# Patient Record
(demographics unavailable — no encounter records)

---

## 2017-01-01 NOTE — HP
DATE OF ADMISSION: 2017

 

DELIVERING OBSTETRICIAN:  Dr. Calderon.

 

HISTORY OF PRESENT ILLNESS:  Baby Jesus Gonzalez was admitted to NICU secondary to prematurity of 34.1 we
eks with premature rupture of membranes.

 

Baby Jesus Gonzalez is a 34.1 week estimated gestational age,  2545 gram birth weight male infant deliver
ed by repeat  section under spinal anesthesia on 2017 at 1421 hours at [facility] with Apgars of 9 at 1 minute and 9 at 5 minutes respectively to a 32-year-old  
3, para 2, AB 0 mother with good prenatal care.  EDC 2017.

 

Mother's prenatal labs are as follows:  Blood group O positive, antibody negative, RPR nonreactive, 
rubella immune, HBsAg negative, HIV negative, GC and chlamydia cultures negative and GBS unknown.  T
here is no history of hypertension, diabetes mellitus, alcohol, tobacco or drug use.  Mother was adm
itted today with premature rupture of membranes at 5:45 a.m. on 2017.  Membranes were ruptured
 for 8.6 hours before delivery.  Mother received 1 dose of Ancef and also received 1 dose of betamet
hasone at 9:39.  Biophysical profile was 8/8.

 

Mother has 2 children who were delivered by  section at 40 weeks in  and .  There we
re no other contributing factors.

 

The NICU team was in attendance at the time of delivery.  The infant was born with good Apgars and d
id not require any resuscitation.  The infant was admitted to NICU secondary to late prematurity.

 

PHYSICAL EXAMINATION:

GENERAL:  Infant in room air, responsive, pink, comfortable, in no acute distress.  No external anom
alies noted.

VITAL SIGNS:  Temperature 37 degrees, heart rate 168, respirations 40, blood pressure 76/32 with a m
jamee of 47.  Chemstrips 55.  Birth weight 2545 grams, length 48.3 cm, head circumference 33 cm.

HEENT:  Anterior fontanelle soft and flat.  Sutures well approximated.  Eyes normal, red reflex posi
tive.  Ears and nose normal and patent.  Palate intact with no cleft palate.

NECK:  Supple.

HEART:  Rate and rhythm regular.  No murmurs.  Peripheral pulses palpable with adequate perfusion.

LUNGS:  No retractions, equal breath sounds, good air exchange and clear.

ABDOMEN:  Soft, nondistended, normal bowel sounds, no masses palpable, no organomegaly, nontender.

GENITALIA:  Normal male with testes descending in the canals.

ANUS:  Patent.

HIPS:  Negative hip clicks.

SPINE:  Normal spine.

CENTRAL NERVOUS SYSTEM:  Infant has good suck, normal tone and symmetric Alphonso and normal activity.

SKIN:  No significant rashes.

 

LABS:  CBC and blood culture ordered.

 

ASSESSMENT:

1.  34.1 week late premature infant.

2.  Premature rupture of membranes.

3.  Delivered by  section.

 

PLAN:

1.  Will start the infant on feeding protocol and also start the infant on intravenous fluids D10W a
t 80 mL/kg per day.

2.  Respiratory, monitor for apnea and desaturations.

3.  Metabolic.  Will check electrolytes if clinically indicated.

4.  Bilirubin.  Mother's blood type is O positive.  Will check infant's blood type and monitor the i
nfant and check bilirubin levels at 48 hours.

5.  Infectious disease and hematology.  GBS is unknown.  Therefore, CBC and blood cultures were obta
ined.  We will treat the infant if clinically indicated.

6.  Cardiovascular.  Blood pressure is stable with no evidence of murmur.

7.  Neurology.  Neurological examination is essentially normal.

8.  Social.  Father was with infant at the bedside and updated the father about the infant's clinica
l condition as well as the treatment plans.  We will also update the mother.

 

 

Dictated By: ANITHA YATES/TEE

DD:    2017 14:57:03

DT:    2017 17:12:03

Conf#: [id number]

DID#:  [id number]

## 2017-01-01 NOTE — PN
[facility] L[facility]


 


 


 


 


 Progress Note 


 


Patient Name: Abrahan Gonzalez Unit Number: [id number]


YOB: 2017 Patient Status: Admitted Inpatient


Attending Doctor: Aster Vuong MD Account Number: [id number]


 


Edit: ASTER VUONG MD on 1/15/17 @ 11:41





Infant examined, chart reviewed and case discussed with Migue RODRIGUEZ as well as 

the bedside care team. This is a 5-day-old, 34.1 week late premature infant 

with a corrected gestational age of 34.5 weeks. Infant's problems or 

significant for hypocalcemia and was started on calcium supplementation as well 

as formula change to PM 6040 yesterday. Weight today is 2400 g decreased by 15 

g. Infant's physical examination shows infant in open crib responsive pink 

comfortable in no acute distress and with essentially normal physical 

examination except for mild perianal redness and jaundice. Concur with the 

complete physical examination documented below.


Medications include caffeine citrated. Labs today showed a calcium level of 8.5 

with a phosphorus level of 7.1 and bilirubin of 10.1.


Infant is on feedings with the breast milk or PM 6040 at 48 ML every 3 hours. 

Infant attempted 5 nipple. Feedings and is able to take only up to 16 ML and 

required mostly complete or partial gavage supplementation. Output is adequate. 

Infant remains stable in room air with no documented apnea bradycardia. Infant'

s bilirubin level has increased to 10 today. Problem list as well as care plans 

reviewed and discussed with Migue RODRIGUEZ and agree with the complete care plans 

documented in the note below.


________________________________________________________________________________

_____


  


Date/Time of Note


Date/Time of Note


DATE: 1/15/17 


TIME: 09:34





Neonatology History


Date/Time


Admit Date/Time


2017 at 14:21





Day of Life


Day of Life


5





History of Present Illness


HPI


This is a 34-1/7 week LGA infant corrected at 34-5/7 weeks' gestation born by 

repeat  section to mom with rupture membranes for 8 hours. No 

supplemental oxygen needed. Admitted for prematurity and poor feeding of the 

. on feedings and needing gavage feeding.hyperphosphatemia and 

hypocalcemia, on PM 60/40 and oral Calcium supplements. At risk for 

hyperbilirubinemia, hypoglycemia, electrolyte imbalance, poor feeding, and long-

term neurodevelopmental problems





Physical Exam


Vital Signs


Vitals





 Vital Signs








  Date Time  Temp Pulse Resp B/P Pulse Ox O2 Delivery O2 Flow Rate FiO2


 


1/15/17 07:22  152 35  96   21


 


1/15/17 05:00 99.1 142 40  97   


 


1/15/17 03:08  149 47  95   21


 


1/15/17 02:00 99.1 159 56  92   





NPASS Score-Pain: 0





I&O/Weight


I&O


Daily Weight: 2400 grams, Daily Weight change from yesterday: -15.0 grams, 

Percent change from birth: -5.697, Weight based intake: 153.3333 mL/kg/day, 

Weight based output: 0 mL/kg/hr





Physical Exam


Active and alert in open bassinet.


HEENT: [place] soft and flat. Eyes clear without drainage. Ears nose and 

throat without abnormality.


Pulmonary: Respirations are comfortable, breath sounds are bilaterally clear 

and equal.


Cardiovascular: Heart rate and rhythm are normal, no murmur is auscultated. 

Perfusion is good with  quick capillary refill.


Abdomen: Soft without distention. No masses palpated. Umbilical stump dry 

without redness


: Normal male genitalia.


Neuro: Tone and behavior appropriate for gestational age.


Dermatology: Perianal redness, mild jaundice


Extremities: Full range of motion, tone and behavior appropriate for 

gestational age.


Head Circumference:  28.0





Medications





 Current Medications


Calcium Gluconate (Ca Gluc Po (Nicu)) 480 mg Q6 PO  Last administered on 1/15/

17at 06:12; Admin Dose 480 MG;  Start 17 at 12:00





Laboratory


Results 24 hrs





Laboratory Tests








Test


  1/15/17


04:45


 


Calcium Level 8.5  


 


Phosphorus Level 7.1  H


 


Total Bilirubin 10.1  











Medical Decision Making


Assessment


1. Growth and nutrition: The infant is tolerating feedings ofBreast milk or PM 

60/40  48 mL every 3 hours with weight loss of 15 g the last 24 hours. The 

infant attempted to nipple 5 feedings taking between 1and 16 mL only and 

required partial gavage, intake 153 MLS per KG per day. Was able to complete by 

bottle only 13% of feedings. OT PT involved. 3 small milk  spit ups. Output is 

good and temperature stable in a crib.


2. Risk apnea prematurity: The infant remains on room air saturations greater 

than or equal to 99% no recorded apnea, bradycardia, or desaturations.


3. Cardiac: Hemodynamically stable last blood pressure mean 43 no clinical 

signs of the ductus arteriosus.


4. Jaundice: Baby is O+ Usama negative bilirubin 1/15 is 10.


5. Metabolic: Electrolytes stable, calcium  7.1 on  and 7.3 on  with 

phos 8.1. PM 60/40 and oral calcium supplements begun . calcium 8.5 with 

phos 7.1 on 1/15


6. Infectious disease: No clinical signs or symptoms blood culture negative CBC 

unremarkable.


7. CNS: Tone appropriate needs hearing screen and car seat challenge prior to 

discharge.


8. Social: Parents at bedside and updated on infant's status and progress





Today's Plan


Plan


1. Continue to work with OT/PT and parents on nutritive support


2.continue feeds of PM 60/40 or breast milk and dc oral calcium supplements


3. Monitor for feeding tolerance, gastroesophageal reflux, consistent weight 

gain


4. Monitor for apnea prematurity


5. Follow-up calcium and phosphorus in a.m.


6. Hearing screen and car seat challenge prior to discharge


7. Same supportive care, training, and teaching.











MIGUE MURILLO NP John 15, 2017 09:39

## 2017-01-01 NOTE — DS
Date/Time of Note


Date/Time of Note


DATE: 17 


TIME: 15:06





 SOAP


Subjective Findings


Other Findings


1. The infant was admitted with history of premature rupture membranes 

resulting in delivery with Apgars of 9 at 1 minute 9 at 5 minutes. No evidence 

of respiratory distress was noted during the hospital stay infant did not have 

any apnea of prematurity either.


2. Hypocalcemia the infant had initial low calcium of 7.1 which was treated 

with calcium gluconate from  through 1/15. Phosphorus level was initially 

elevated at 8.7 normalized at 6.8 prior to discharge.


3. Jaundice: The infant was O+ Usama negative. Had a maximum bilirubin 12.4 

treated with phototherapy from  and 


4. Nutrition: The infant initially was placed on IV fluids and started on 

feedings advancing slowly. The infant initially had poor nippling but that 

improved had limited weight gain on  through .  At the time of 

discharge the infant now being 65 g is on breast milk plus NeoSure.


5. Discharge testing the infant had a hearing screen done which was passed car 

seat challenge which was passed congenital heart disease screen which was passed





Vital Signs


Vital Signs





 Vital Signs








  Date Time  Temp Pulse Resp B/P Pulse Ox O2 Delivery O2 Flow Rate FiO2


 


17 14:30 98.6 145 38  97   


 


17 11:30 98.8 164 40  96   


 


17 11:06  181 36  99   21


 


17 08:00 98.8 160 46 65/34 96   


 


17 07:46  136 43  98   21





NPASS Score-Pain: 0





Physical Exam


HEENT:  [place] open,soft,flat, Normocephalic


Lungs:  Clear to auscultation


Heart:  Regular R&R, No murmur


Abdomen:  Soft, No hepatosplenomegaly


Skin:  No rashes, No signs of jaundice





Assessment


Pre-Term [place]:  Boy


Assessment:  SGA,  Jaundice





Plan


Feedings every 2-3 hours with breast milk or NeoSure advance 22-calorie per 

ounce with a minimum of 45 mL every 3 hours.


Follow-up with Dr. Serra in 2 days


No discharge medications





Pending Labs/Cultures


PKU pending





Condition on Discharge


[place] Condition:  Stable











GEO JOHNSON MD 2017 15:10

## 2017-01-01 NOTE — PN
[facility]


 


 


 


 


 Progress Note 


 


Patient Name: Abrahan Gonzalez Unit Number: [id number]


YOB: 2017 Patient Status: Admitted Inpatient


Attending Doctor: Aster Vuogn MD Account Number: [id number]


 


Edit: ROBERTA AMES MD on 17 @ 12:37





I have seen and examined and reviewed the Plan with the nurse practitioner. 

Agree with the exam, evaluation,


And treatment plan to continue same feeds, monitor input, output and weight 

closely, encourage nippling and


 advance as tolerated , watch for clinical apnea and bradycardia and maintain 

oxygen saturations greater than 90%,


Follow serum calcium level and continue breast milk feeds.


________________________________________________________________________________

_____


  


Date/Time of Note


Date/Time of Note


DATE: 17 


TIME: 09:41





Neonatology History


Date/Time


Admit Date/Time


2017 at 14:21





Day of Life


Day of Life


9





History of Present Illness


HPI


This is a 34-1/7 week aga infant corrected at 35-2/7 weeks' gestation born by 

repeat  section to mom with  rupture of membranes.  the infant 

is a poor nipple feeder requiring ng feedings and ot support, has history of 

hypocalcemia requiring calcium gluconate supplementation and  PM 60/40 ,, now 

stable on breast milk.marilia elevated  and put on blanket,At risk for 

hyperbilirubinemia, hypoglycemia, sepsis/nec, electrolyte imbalance, and long-

term neurodevelopmental problems





Physical Exam


Vital Signs


Vitals





 Vital Signs








  Date Time  Temp Pulse Resp B/P Pulse Ox O2 Delivery O2 Flow Rate FiO2


 


17 08:30 98.8 156 40 83/37 96   


 


17 07:59  155 41  98   21


 


17 05:00 98.8 164 36  99   


 


17 03:07  169 37  97   21


 


17 02:00 98.6 158 42  97   





NPASS Score-Pain: 0





I&O/Weight


I&O


Daily Weight: 2435 grams, Daily Weight change from yesterday: 55.0 grams, 

Percent change from birth: -4.322, Weight based intake: 156.0784 mL/kg/day, 

Weight based output: 0 mL/kg/hr





Physical Exam


Active and alert in open bassinet.


HEENT: [place] soft and flat. Eyes clear without drainage. Ears nose and 

throat without abnormality.


Pulmonary: Respirations are comfortable, breath sounds are bilaterally clear 

and equal.


Cardiovascular: Heart rate and rhythm are normal, no murmur is auscultated. 

Perfusion is good with  quick capillary refill.


Abdomen: Soft without distention. No masses palpated.


: Normal male genitalia. Fresh circumcision no bleeding noted


Neuro: Tone and behavior appropriate for gestational age.


Dermatology: Skin clear and free of rashes. Mild jaundice noted


Extremities: Full range of motion, tone and behavior appropriate for 

gestational age.


Head Circumference:  32.5





Medications





 Current Medications


Multivitamins/ Vitamin C (Poly-Vi-Sol (Nicu)) 1 ml DAILY PO  Last administered 

on t 08:25; Admin Dose 1 ML;  Start 17 at 09:00





Laboratory


Results 24 hrs





Laboratory Tests








Test


  17


05:05


 


Total Bilirubin 12.4  H











Medical Decision Making


Assessment


1. Nutrition. Infant's Daily Weight: 2435 grams,up by 55 g in the past 24 hours 

decreased by 6 percent since birth. Weight based intake: 156mL/kg/day, infant 

voided 8 and stool 4 over previous 24 hours. Infant's intake includes 20-

calorie per ounce breastmilk  . Nippled completely 6 feeds, taking 89% by bottle

,Partially nipple fed 2 feedings  Minimal residuals


2. Risk apnea prematurity: The infant remains on room air. No recorded apnea, 

bradycardia, or desaturations noted over previous 24 hours.


3. Jaundice: Blood type O+ Usama negative. Bilirubin  is 9.9 from previous 

level of 10 on 1/15.up to 12.4 on , perhaps breast milk jaundice?


4. Early transient hypocalcemia of :  calcium was 7.1 on , 7.3 on  with phos 8.1. PM 60/40 and oral calcium supplements begun . Calcium of 

9.4 and phosphorus was 6.8 on ,calcium dc'd 1/15


5. CNS: Tone appropriate needs car seat challenge prior to discharge, hearing 

screen passed





Today's Plan


Plan


Continue to work on nippling feeds


Continue with frequent monitoring of vital signs monitor for apneas and 

bradycardias


Monitor for sepsis/necrotizing enterocolitis


begin bili blanket and follow bili in AM


Maintain neutral thermal environment


Maintain communications with family members


if no Breast milk, give MIGUE Loving NP 2017 09:45

## 2017-01-01 NOTE — PN
[facility]


 


 


 


 


 Progress Note 


 


Patient Name: Abrahan Gonzalez Unit Number: [id number]


YOB: 2017 Patient Status: Admitted Inpatient


Attending Doctor: Aster Vuong MD Account Number: [id number]


 


Edit: ROBERTA AMES MD on 17 @ 12:32





I have seen and examined the baby and reviewed the care plan with the nurse 

practitioner. Agree with the exam, evaluation


And treatment plan to continue same feeds, encourage nippling, continue to 

monitor serum calcium and started on regular


Formula, watch for clinical apnea and bradycardia and maintain oxygen 

saturations greater than 90%, watch for clinical jaundice


And monitor bilirubin as needed. Baby needs continued hospital observation 

until stable with nipple feeds and weight gain.


________________________________________________________________________________

_____


  


Date/Time of Note


Date/Time of Note


DATE: 17 


TIME: 10:53





Neonatology History


Date/Time


Admit Date/Time


2017 at 14:21





Day of Life


Day of Life


7





History of Present Illness


HPI


This is a 34-1/7 week aga infant corrected at 35-0/7 weeks' gestation born by 

repeat  section to mom with  rupture of membranes.  the infant 

is a poor nipple feeder requiring ng feedings and ot support, has history of 

hypocalcemia requiring calcium gluconate supplementation and  PM 60/40 .. At 

risk for hyperbilirubinemia, hypoglycemia, sepsis/nec, electrolyte imbalance, 

and long-term neurodevelopmental problems





Physical Exam


Vital Signs


Vitals





 Vital Signs








  Date Time  Temp Pulse Resp B/P Pulse Ox O2 Delivery O2 Flow Rate FiO2


 


17 07:46  152 41  95   21


 


17 05:00 98.8 150 42  100   


 


17 03:13  142 37  94   21





NPASS Score-Pain: 0





I&O/Weight


I&O


Daily Weight: 2360 grams, Daily Weight change from yesterday: -40.0 grams, 

Percent change from birth: -7.269, Weight based intake: 150.9803 mL/kg/day, 

Weight based output: 0 mL/kg/hr





Physical Exam


Active and alert in [facility].


HEENT: [place] soft and flat. Eyes clear without drainage. Ears nose and 

throat without abnormality.


Pulmonary: Respirations are comfortable, breath sounds are bilaterally clear 

and equal.


Cardiovascular: Heart rate and rhythm are normal, no murmur is auscultated. 

Perfusion is good with  quick capillary refill.


Abdomen: Soft without distention. No masses palpated.


: Normal male genitalia.


Neuro: Tone and behavior appropriate for gestational age.


Dermatology: Mild perianal redness


Extremities: Full range of motion, tone and behavior appropriate for 

gestational age.


Head Circumference:  33.0





Medical Decision Making


Assessment


1. Nutrition. Infant's Daily Weight: 2360 grams, down by 40 g in the past 24 

hours decreased by 7 percent since birth. Weight based intake: 150mL/kg/day, 

infant voided 6 and stool 4 over previous 24 hours. Infant's intake includes 

20-calorie per ounce breastmilk as well as PM 60/40. Nippled completely 4, 

taking 63% by bottle,Partially nipple fed 3 feedings  Minimal residuals


2. Risk apnea prematurity: The infant remains on room air. No recorded apnea, 

bradycardia, or desaturations noted over previous 24 hours.


3. Jaundice: Blood type O+ Usama negative. Bilirubin  is 9.9 from previous 

level of 10 on 1/15.


5. Early transient hypocalcemia of :  calcium was 7.1 on , 7.3 on  with phos 8.1. PM 60/40 and oral calcium supplements begun . Calcium of 

9.4 and phosphorus was 6.8 on ,calcium dc'd 1/15


6. CNS: Tone appropriate needs hearing screen and car seat challenge prior to 

discharge





Today's Plan


Plan


Continue to work on nippling feeds


Continue with frequent monitoring of vital signs monitor for apneas and 

bradycardias


Monitor for sepsis/necrotizing enterocolitis


Monitor for jaundice clinically


Maintain neutral thermal environment


Maintain communications with family members


if no Breast milk, give dipeshsure











MIGUE MURLILO NP 2017 10:58

## 2017-01-01 NOTE — PN
Date/Time of Note


Date/Time of Note


DATE: 17 


TIME: 12:18





Neonatology History


Date/Time


Admit Date/Time


2017 at 14:21





Day of Life


Day of Life


11





History of Present Illness


HPI


This is a 34-1/7 week aga infant corrected at 35 4/7 weeks' gestation born by 

repeat  section to mom with  rupture of membranes.  the infant 

is a poor nipple feeder requiring ng feedings and OT support, has history of 

hypocalcemia S/P  calcium gluconate supplementation and  PM 60/40,  

hyperbilirubinemia requiring phototherapy,At risk for   hypoglycemia, sepsis/nec

, electrolyte imbalance, and long-term neurodevelopmental problems





Physical Exam


Vital Signs


Vitals





 Vital Signs








  Date Time  Temp Pulse Resp B/P Pulse Ox O2 Delivery O2 Flow Rate FiO2


 


17 11:04  167 49  100   21


 


17 10:30 98.6 165 48  100   


 


17 08:03 98.8 135 58 71/48 100   


 


17 07:18  206 66  95   21


 


17 05:30 97.9 150 34  96   





NPASS Score-Pain: 0





I&O/Weight


I&O


Daily Weight: 2400 grams, Daily Weight change from yesterday: -15.0 grams, 

Percent change from birth: -5.697, Weight based intake: 131.7647 mL/kg/day, 

Weight based output: 0 mL/kg/hr





Physical Exam


HEENT: [place] soft flat, eyes clear no discharge, ears normal, nose patent, 

oropharynx normal.


Chest: Breath sounds equal clear no rales, rhonchi, or retractions.


Cardiac: Regular rhythm no murmurs appreciated with good pulses


Abdomen: Soft, round, no organomegaly or masses noted with good bowel sounds.


Genitalia: Normal male, patent anus.


Extremities: Full range of motion with good perfusion


CNS: Tone appropriate response to pain and touch.


Skin: Pink with no rashes.


Head Circumference:  32.5





Medications





 Current Medications


Multivitamins/ Vitamin C (Poly-Vi-Sol (Nicu)) 1 ml DAILY PO  Last administered 

on t 07:55; Admin Dose 1 ML;  Start 17 at 09:00





Medical Decision Making


Assessment


1. Growth and nutrition: The infant is tolerating ad padmini. nipple feedings 

taking between 45 and 65 mL every 3 hours of breast milk or NeoSure advance 22-

calorie. The infant however continues to lose weight down 35 g over the last 2 

days. We'll maintain on nutritive support may need to consider advancing to 22 

vangie if weight gain doesn't occur in the next 24 hours. No emesis no clinical 

signs of gastroesophageal reflux. Output is good and temperature stable in a 

crib.


2. Apnea prematurity: The infant remains on room air with saturations greater 

than or equal to 95% no recorded apnea, bradycardia, or desaturations last 24 

hours.


3. Cardiac: Hemodynamically stable less blood pressure mean 54 signs of the 

ductus arteriosus.


4. Anemia: Last hematocrit 54.5 done on  remains on Poly-Vi-Sol recheck in 

a.m.


5. CNS: Tone appropriate hearing screen was passed congenital heart disease 

screen was passed, car seat challenge passed.


6. Social: Parents visiting and updated on infant's status and progress.





Today's Plan


Plan


1. Continue ad padmini. feedings and monitor for consistent weight gain.


2. Monitor for feeding tolerance or clinical signs of gastroesophageal reflux


3. Monitor for apnea prematurity


4. Complete discharge training and teaching





Anticipate discharge in a.m. if the infant has weight gain. If no weight gain we

'll consider advancing to 22-calorie/oz











GEO JOHNSON MD 2017 12:28

## 2017-01-01 NOTE — PN
Date/Time of Note


Date/Time of Note


DATE: 17 


TIME: 13:54





Neonatology History


Date/Time


Admit Date/Time


2017 at 14:21





Day of Life


Day of Life


10





History of Present Illness


HPI


This is a 34-1/7 week aga infant corrected at 35-3/7 weeks' gestation born by 

repeat  section to mom with  rupture of membranes.  the infant 

is a poor nipple feeder requiring ng feedings and ot support, has history of 

hypocalcemia S/P  calcium gluconate supplementation and  PM 60/40,  

hyperbilirubinemia requiring phototherapy,At risk for   hypoglycemia, sepsis/nec

, electrolyte imbalance, and long-term neurodevelopmental problems





Physical Exam


Vital Signs


Vitals





 Vital Signs








  Date Time  Temp Pulse Resp B/P Pulse Ox O2 Delivery O2 Flow Rate FiO2


 


17 11:17  159 66  96   21


 


17 08:30 99.1 160 48 87/37 99   


 


17 07:51  175 65  94   21





NPASS Score-Pain: 0





Physical Exam


HEENT: [place] soft and flat. Eyes clear without drainage. Ears nose and 

throat without abnormality.


Pulmonary: Respirations are comfortable, breath sounds are bilaterally clear 

and equal.


Cardiovascular: Heart rate and rhythm are normal, no murmur is auscultated.  


Abdomen: Soft without distention. No masses palpated.


: Normal male genitalia, circumcised


Neuro: Tone and behavior appropriate for gestational age.


Dermatology: Skin clear and free of rashes. Mild jaundice noted


Extremities: Full range of motion, tone and behavior appropriate for 

gestational age.


Head Circumference:  32.5





Medications





 Current Medications


Multivitamins/ Vitamin C (Poly-Vi-Sol (Nicu)) 1 ml DAILY PO  Last administered 

on t 08:17; Admin Dose 1 ML;  Start 17 at 09:00





Laboratory


Results 24 hrs





Laboratory Tests








Test


  17


05:10


 


Total Bilirubin 9.1  #











Medical Decision Making


Assessment


dol 10 for 34 1/7 week late  infant


1. nutrition.  infant's Daily Weight: 2415 grams, decreased by 20.0 grams over 

previous 24 hours.  Weight based intake: 169.0196 mL/kg/day, voided x 8 and 

stooled x 7 over previous 24 hours. infant's intake includes 20-calorie per 

ounce breast milk feedings. The infant was able to nipple feed ad padmini. 6 over 

previous 24 hours. To partially nipple feedings were noted above 30 mL each for 

which the infant was gavage fed 2.


2. Risk apnea prematurity: The infant remains on room air. No recorded apnea, 

bradycardia, or desaturations noted over previous 24 hours.


3. Hyperbilirubinemia. Blood type O+ Usama negative. Placed on phototherapy on 

 for a bilirubin of 12.4. Bilirubin this morning has decreased to 9.1  


4. Early transient hypocalcemia of :  calcium was 7.1 on . PM 60/40 

and oral calcium supplements begun 1/141/15. Calcium of 9.4 and phosphorus was 

6.8 on , within acceptable limits 


5. CNS: In open crib maintaining temperatures.


6. Social. Parents visiting and learning infant care





Today's Plan


Plan


Continue to work on nippling feeds


Frequent monitoring of vital signs monitor for apneas and bradycardias


Monitor for sepsis/necrotizing enterocolitis


Discontinue phototherapy


Monitor for symptomatic hypocalcemia


Discharge preparation including car seat challenge


Critical congenital heart disease screening passed


Hepatitis B vaccine prior to discharge











YANA JAFFE MD 2017 13:55

## 2017-01-01 NOTE — PDOCDIS
NICU Discharge Instructions


Pediatrician Information








Follow-up with Physician:   2





 Day/Days











Diet


Feeding Instructions:  Breast Feed Ad LibNICU Formula:  Enfamil Enfacare 22cal





Additional Instructions


Additional Information


Feedings every 2-3 hours with breast milk or NeoSure advance 22-calorie per 

ounce with a minimum of 45 mL every 3 hours.


Follow-up with Dr. Serra in 2 days


No discharge medications











GEO JOHNSON MD Jan 22, 2017 15:06

## 2017-01-01 NOTE — PN
[facility]


 


 


 


 


 Progress Note 


 


Patient Name: Abrahan Gonzalez Unit Number: [id number]


YOB: 2017 Patient Status: Admitted Inpatient


Attending Doctor: Aster Vuong MD Account Number: [id number]


 


Edit: YANA JAFFE MD on 17 @ 16:20





I have examined and rounded on the patient at the bedside with the  

care team. I have reviewed the caregiver's physical exam, assessment and plan 

and agree with today's plan of care





Yana Jaffe


________________________________________________________________________________

_____


  


Date/Time of Note


Date/Time of Note


DATE: 17 


TIME: 10:17





Neonatology History


Date/Time


Admit Date/Time


2017 at 14:21





Day of Life


Day of Life


2





History of Present Illness


HPI


This is a 34-1/7 week LGA infant born by repeat  section to mom with 

rupture membranes for 8 hours. No supplemental oxygen needed. Admitted for 

prematurity. Started on feeding protocol and needing gavage feeding. At risk 

for hyperbilirubinemia, hypoglycemia, electrolyte imbalance, poor feeding, and 

long-term neurodevelopmental problems





Physical Exam


Vital Signs


Vitals





 Vital Signs








  Date Time  Temp Pulse Resp B/P Pulse Ox O2 Delivery O2 Flow Rate FiO2


 


17 09:00 98.2 152 68 69/31 100   


 


17 07:58  148 39  100   21


 


17 06:10 98.2 133 55  99   


 


17 03:09  132 47  99   21


 


17 03:00 97.9 144 52  99   





NPASS Score-Pain: 0





I&O/Weight


I&O


Daily Weight: 2540 grams, Daily Weight change from yesterday: -5.0 grams, 

Percent change from birth: -0.196, Weight based intake: 0 mL/kg/day, Weight 

based output: 1.768 mL/kg/hr





Physical Exam


Active and alert. On panda warmer on room air


HEENT: [place] soft and flat. Eyes clear without drainage. Ears nose and 

throat without abnormality.


Pulmonary: Respirations are comfortable, breath sounds are bilaterally clear 

and equal.


Cardiovascular: Heart rate and rhythm are normal, no murmur is auscultated. 

Perfusion is good with  quick capillary refill.


Abdomen: Soft without distention. No masses palpated.


: Normal male genitalia.


Neuro: Tone and behavior appropriate for gestational age.


Dermatology: Skin clear and free of rashes. Minimal jaundice


Extremities: Full range of motion, tone and behavior appropriate for 

gestational age.


Head Circumference:  28.0





Medications





 Current Medications


Dextrose (D10w (Nicu)) 250 ml @  8.5 mls/hr Q24H IV  Last administered on t 15:05; Admin Dose 8.5 MLS/HR;  Start 17 at 14:44





Laboratory


Results 24 hrs





Laboratory Tests








Test


  17


14:48 17


15:00 17


16:19 17


05:51


 


Bedside Glucose 55  L  90   75  


 


Anisocytosis  1+    


 


Blood Morphology Comment      


 


Eosinophils #  0.3    


 


Eosinophils %  4.0    


 


Hematocrit  54.5    


 


Hemoglobin  18.8    


 


Lymphocytes #  3.2  H  


 


Lymphocytes %  37.0    


 


Macrocytosis  1+    


 


Mean Corpuscular Hemoglobin  35.2  H  


 


Mean Corpuscular Hemoglobin


Concent 


  34.4  


  


  


 


 


Mean Corpuscular Volume  102.4    


 


Mean Platelet Volume  7.4    


 


Monocytes #  0.9    


 


Monocytes %  10.0    


 


Neutrophils #  4.2    


 


Neutrophils %  49.0  L  


 


Nucleated Red Blood Cells #      


 


Nucleated Red Blood Cells %  6.0  H  


 


Platelet Count  224    


 


Platelet Estimate


  


  PLT APPEAR


ADEQUATE 


  


 


 


Polychromasia  1+    


 


Red Blood Count  5.32    


 


Red Cell Distribution Width  17.1  H  


 


White Blood Count  8.6    











Medical Decision Making


Assessment


1. Growth and nutrition: The infant was started on feeding protocol on 

admission and currently is tolerating feedings of Similac special care 20-

calorie 22 MLS every 3 hours mostly by NG tube with supplemental IV fluids of 

D10 W. Has had minimal residuals. Attempted to nipple feed twice taking only 

small amounts 2 MLS at 1.6 MLS other. Accu-Cheks screen 75


2. Respiratory: The infant has not required supplemental oxygen outside the 

delivery room and has not had any episodes of apnea bradycardia or desaturation


3. infection: Infant's screening CBC shows a white count of 8.6 with platelet 

count 224,000 and a hematocrit 55 and a normal differential. Infant is not on 

antibiotics, blood cultures pending. There was rupture membranes for 8 hours 

and mom's GBS status was unknown


4. Hematology: Hematocrit on admission is 55. Baby appears mildly jaundiced


5. Social: Father has been at bedside and been updated





Today's Plan


Plan


1. Continue to advance feedings per protocol and wean IV fluids. Follow Accu-

Cheks screens. Cue based nippling as tolerated.


2. Continue neutral thermal environment and monitor vital signs frequently


3. Follow blood culture results and monitor for signs of infection


4. Check electrolytes and calcium in the morning as well as bilirubin


5. Update family with plans and information











MIGUE MURILLO NP 2017 10:23

## 2017-01-01 NOTE — PN
Date/Time of Note


Date/Time of Note


DATE: 17 


TIME: 12:46





Neonatology History


Date/Time


Admit Date/Time


2017 at 14:21





Day of Life


Day of Life


6





History of Present Illness


HPI


This is a 34-1/7 week aga infant corrected at 34-6/7 weeks' gestation born by 

repeat  section to mom with  rupture of membranes.  the infant 

is a poor nipple feeder requiring ng feedings and ot support, has history of 

hypocalcemia requiring calcium gluconate supplementation and  PM 60/40 .. At 

risk for hyperbilirubinemia, hypoglycemia, sepsis/nec, electrolyte imbalance, 

and long-term neurodevelopmental problems





Physical Exam


Vital Signs


Vitals





 Vital Signs








  Date Time  Temp Pulse Resp B/P Pulse Ox O2 Delivery O2 Flow Rate FiO2


 


17 11:11  156 44  98   21


 


17 11:00 98.2 144 49  100   


 


17 08:00 98.8 139 53 63/36 99   


 


17 07:24  170 48  97   21


 


17 05:00 98.4 150 48  99   





NPASS Score-Pain: 0





I&O/Weight


I&O








Physical Exam


HEENT: Anterior fontanelles open and flat. There is no cleft lip or palate. 

Nasogastric tube is in place


Pulmonary: Good air exchange bilaterally. No grunting, flaring, or retractions


Cardiovascular: Regular rate and rhythm. No audible murmur


Abdomen: Soft, nondistended. Adequate bowel sounds. No discoloration. No 

masses. Umbilicus within normal limits


: Normal male genitalia


Extremities: well-perfused


DERM: Mild jaundice. No rashes


Neuro: Normal tone. Normal response to touch and stimuli


Head Circumference:  28.0





Laboratory


Results 24 hrs





Laboratory Tests








Test


  17


04:00


 


Calcium Level 9.4  


 


Phosphorus Level 6.8  H


 


Total Bilirubin 9.9  











Medical Decision Making


Assessment


Day of life 6 for 34 and 1/7 week late  infant


1. Nutrition. Infant's Daily Weight: 2400 grams, essentially unchanged over 

previous 24 hours. Decreased by -5 percent since birth. Weight based intake: 

152.1568 mL/kg/day, infant voided 6 and stool 4 over previous 24 hours. Infant

's intake includes 20-calorie per ounce breastmilk as well as PM 60/40. Nippled 

completely 1. Partially nipple fed between 5-28 ML's of feedings 7. Was 

gavage fed 7. Minimal residuals


2. Risk apnea prematurity: The infant remains on room air. No recorded apnea, 

bradycardia, or desaturations noted over previous 24 hours.


3. Jaundice: Blood type O+ Usama negative. Bilirubin this morning has 

decreased to 9.9 from previous level of 10 on 1/15.


5. Early transient hypocalcemia of :  calcium was 7.1 on , 7.3 on  with phos 8.1. PM 60/40 and oral calcium supplements begun . Calcium of 

9.4 and phosphorus was 6.8 on 


6. CNS: Tone appropriate needs hearing screen and car seat challenge prior to 

discharge





Today's Plan


Plan


Continue to work on nippling feeds


Continue with frequent monitoring of vital signs monitor for apneas and 

bradycardias


Monitor for sepsis/necrotizing enterocolitis


Monitor for jaundice clinically


Maintain neutral thermal environment


Maintain communications with family members











YANA JAFFE MD 2017 12:53

## 2017-01-01 NOTE — PN
[facility]


 


 


 


 


 Progress Note 


 


Patient Name: Abrahan Gonzalez Unit Number: [id number]


YOB: 2017 Patient Status: Admitted Inpatient


Attending Doctor: Aster Vuong MD Account Number: [id number]


 


Edit: GEO JOHNSON MD on 17 @ 14:18





I have seen and examined this infant with GAEL RODRIGUEZ.  Concur with physical 

examination and assessment. HEENT normal, chest clear good breath sounds, heart 

regular rhythm no murmurs, abdomen soft good bowel sounds no organomegaly, 

genitalia normal, extremities full range of motion good perfusion, CNS tone 

appropriate, skin pink no rashes.  Concur with plan to work on nutritive support

, monitor for respiratory distress or apnea prematurity, follow hematocrit 

weekly, complete discharge training and teaching.


________________________________________________________________________________

_____


  


Date/Time of Note


Date/Time of Note


DATE: 17 


TIME: 09:58





Neonatology History


Date/Time


Admit Date/Time


2017 at 14:21





Day of Life


Day of Life


8





History of Present Illness


HPI


This is a 34-1/7 week aga infant corrected at 35-1/7 weeks' gestation born by 

repeat  section to mom with  rupture of membranes.  the infant 

is a poor nipple feeder requiring ng feedings and ot support, has history of 

hypocalcemia requiring calcium gluconate supplementation and  PM 60/40 ,, now 

stable on breast milk.. At risk for hyperbilirubinemia, hypoglycemia, sepsis/nec

, electrolyte imbalance, and long-term neurodevelopmental problems





Physical Exam


Vital Signs


Vitals





 Vital Signs








  Date Time  Temp Pulse Resp B/P Pulse Ox O2 Delivery O2 Flow Rate FiO2


 


17 07:50  137 40  98   21


 


17 05:00 98.6 144 45  98   


 


17 03:06  148 36  97   21


 


17 02:00 98.4 142 38  97   





NPASS Score-Pain: 0





I&O/Weight


I&O


Daily Weight: 2380 grams, Daily Weight change from yesterday: 20.0 grams, 

Percent change from birth: -6.483, Weight based intake: 153.7254 mL/kg/day, 

Weight based output: 0 mL/kg/hr





Physical Exam


Active and alert. In open bassinet


HEENT: [place] soft and flat. Eyes clear without drainage. Ears nose and 

throat without abnormality.


Pulmonary: Respirations are comfortable, breath sounds are bilaterally clear 

and equal.


Cardiovascular: Heart rate and rhythm are normal, no murmur is auscultated. 

Perfusion is good with  quick capillary refill.


Abdomen: Soft without distention. No masses palpated.


: Normal male genitalia.


Neuro: Tone and behavior appropriate for gestational age.


Dermatology: Perianal redness noted. Mild jaundice


Extremities: Full range of motion, tone and behavior appropriate for 

gestational age.


Head Circumference:  33.0





Medications





 Current Medications


Multivitamins/ Vitamin C (Poly-Vi-Sol (Nicu)) 1 ml DAILY PO  Last administered 

on t 08:09; Admin Dose 1 ML;  Start 17 at 09:00





Medical Decision Making


Assessment


1. Nutrition. Infant's Daily Weight: 2380 grams,up by 20 g in the past 24 hours 

decreased by 7 percent since birth. Weight based intake: 150mL/kg/day, infant 

voided 6 and stool 4 over previous 24 hours. Infant's intake includes 20-

calorie per ounce breastmilk  . Nippled completely 5 feeds, taking 83% by bottle

,Partially nipple fed 3 feedings  Minimal residuals


2. Risk apnea prematurity: The infant remains on room air. No recorded apnea, 

bradycardia, or desaturations noted over previous 24 hours.


3. Jaundice: Blood type O+ Usama negative. Bilirubin  is 9.9 from previous 

level of 10 on 1/15.


5. Early transient hypocalcemia of :  calcium was 7.1 on , 7.3 on  with phos 8.1. PM 60/40 and oral calcium supplements begun . Calcium of 

9.4 and phosphorus was 6.8 on ,calcium dc'd 1/15


6. CNS: Tone appropriate needs car seat challenge prior to discharge, hearing 

screen passed





Today's Plan





Today's Plan


Plan


Continue to work on nippling feeds


Continue with frequent monitoring of vital signs monitor for apneas and 

bradycardias


Monitor for sepsis/necrotizing enterocolitis


Monitor for jaundice clinically


Maintain neutral thermal environment


Maintain communications with family members


if no Breast milk, give MIGUE Loving NP 2017 10:01

## 2017-01-01 NOTE — PN
[facility]


 


 


 


 


 Progress Note 


 


Patient Name: Abrahan Gonzalez Unit Number: [id number]


YOB: 2017 Patient Status: Admitted Inpatient


Attending Doctor: Aster Vuong MD Account Number: [id number]


 


Edit: ASTER VUONG MD on 17 @ 11:40





Infant examined, chart reviewed and case discussed with Migue RODRIGUEZ as well as 

care team. This is a 34.1 week premature infant who is LGA and today is day 4 

of life with a corrected gestational age of 4-34-4/7 week. Weight today is 2415 

g decreased by 55 g. Infant has lost some 5% weight from birth. Intake and 

output is adequate.


Infant in [place] responsive pink comfortable in no acute distress and infant'

s physical examination is essentially normal and concur with the complete 

physical examination documented in the note below. Labs today showed an albumin 

of 2.9 calcium level of 7.4 phosphorus 8.7 total protein 5.2.


Infant is on full feedings receiving Similac special care at 48 ML every 3 

hours by mouth/NG. Infant was able to nipple 1-20 ML and required partial 

gavage supplementation as well as complete gavage feedings. No clinical signs 

of for JT and NEC. Infant has hypocalcemia with a calcium level of 7.3. Infant 

is asymptomatic and phosphorus level is 8.1. We will change the infant to PM 

6040 and also supplement with calcium.


Rest of the problem list as well as care plans reviewed and discussed with the 

care team. Agree with the complete care plans documented below.


________________________________________________________________________________

_____


  


Date/Time of Note


Date/Time of Note


DATE: 17 


TIME: 10:20





Neonatology History


Date/Time


Admit Date/Time


2017 at 14:21





Day of Life


Day of Life


4





History of Present Illness


HPI


This is a 34-1/7 week LGA infant corrected at 34-4/7 weeks' gestation born by 

repeat  section to mom with rupture membranes for 8 hours. No 

supplemental oxygen needed. Admitted for prematurity and poor feeding of the 

. on feedings and needing gavage feeding.hyperphosphatemia and 

hypocalcemia, on PM 60/40 and oral Calcium supplements. At risk for 

hyperbilirubinemia, hypoglycemia, electrolyte imbalance, poor feeding, and long-

term neurodevelopmental problems





Physical Exam


Vital Signs


Vitals





 Vital Signs








  Date Time  Temp Pulse Resp B/P Pulse Ox O2 Delivery O2 Flow Rate FiO2


 


17 08:00 98.4 154 40 61/30 96   


 


17 07:28  166 30  95   21


 


17 05:00 97.9 149 31  96   


 


17 03:10  149 37  97   21





NPASS Score-Pain: 0





I&O/Weight


I&O


Daily Weight: 2415 grams, Daily Weight change from yesterday: -55.0 grams, 

Percent change from birth: -5.108, Weight based intake: 147.4509 mL/kg/day, 

Weight based output: 0 mL/kg/hr





Physical Exam


Active and alert in bassinet on room air.


HEENT: [place] soft and flat. Eyes clear without drainage. Ears nose and 

throat without abnormality.


Pulmonary: Respirations are comfortable, breath sounds are bilaterally clear 

and equal.


Cardiovascular: Heart rate and rhythm are normal, no murmur is auscultated. 

Perfusion is good with  quick capillary refill.


Abdomen: Soft without distention. No masses palpated. Umbilical stump dry 

without redness


: Normal male genitalia.


Neuro: Tone and behavior appropriate for gestational age.


Dermatology: Skin clear and free of rashes. Mild jaundice


Extremities: Full range of motion, tone and behavior appropriate for 

gestational age.


Head Circumference:  28.0





Laboratory


Results 24 hrs





Laboratory Tests








Test


  17


04:35


 


Albumin 2.9  L


 


Calcium Level 7.4  L


 


Phosphorus Level 8.7  H


 


Total Protein 5.2  L











Medical Decision Making


Assessment


1. Growth and nutrition: The infant is tolerating feedings of Similac special 

care 48 mL every 3 hours with weight loss of 55 g the last 24 hours. The infant 

attempted to nipple 3 feedings taking between 1and 20 mL only and required 

partial gavage. OT PT involved. No emesis no conical signs of gastroesophageal 

reflux or NEC. Output is good and temperature stable in a crib.


2. Risk apnea prematurity: The infant remains on room air saturations greater 

than or equal to 99% no recorded apnea, bradycardia, or desaturations.


3. Cardiac: Hemodynamically stable last blood pressure mean 43 no clinical 

signs of the ductus arteriosus.


4. Jaundice: Baby is O+ Usama negative bilirubin  is 6.6.


5. Metabolic: Electrolytes stable, calcium however only 7.1 on  and 7.3 on  with phos 8.1


6. Infectious disease: No clinical signs or symptoms blood culture negative CBC 

unremarkable.


7. CNS: Tone appropriate needs hearing screen and car seat challenge prior to 

discharge.


8. Social: Parents at bedside and updated on infant's status and progress





Today's Plan


Plan


1. Continue to work with OT/PT and parents on nutritive support


2. Change feeds to PM 60/40 or breast milk and add oral calcium supplements


3. Monitor for feeding tolerance, gastroesophageal reflux, consistent weight 

gain


4. Monitor for apnea prematurity


5. Follow bilirubin in a.m.


6. Follow-up calcium and phosphorus in a.m.


7. Hearing screen and car seat challenge prior to discharge


8. Same supportive care, training, and teaching.











MIGUE MURILLO NP 2017 10:24

## 2017-01-01 NOTE — PN
Date/Time of Note


Date/Time of Note


DATE: 17 


TIME: 13:47





Neonatology History


Date/Time


Admit Date/Time


2017 at 14:21





Day of Life


Day of Life


3





History of Present Illness


HPI


This is a 34-1/7 week LGA infant corrected at 34-3/7 weeks' gestation born by 

repeat  section to mom with rupture membranes for 8 hours. No 

supplemental oxygen needed. Admitted for prematurity and poor feeding of the 

. Started on feeding protocol and needing gavage feeding. At risk for 

hyperbilirubinemia, hypoglycemia, electrolyte imbalance, poor feeding, and long-

term neurodevelopmental problems





Physical Exam


Vital Signs


Vitals





 Vital Signs








  Date Time  Temp Pulse Resp B/P Pulse Ox O2 Delivery O2 Flow Rate FiO2


 


17 12:00 99.1 160 58  98   


 


17 11:08  150 36  98   21


 


17 09:00 98.2 139 32 64/32 99   


 


17 07:55  136 42  99   


 


17 07:37  152 36  100   21





NPASS Score-Pain: 0





I&O/Weight


I&O


Daily Weight: 2470 grams, Daily Weight change from yesterday: -70.0 grams, 

Percent change from birth: -2.946, Weight based intake: 114.9606 mL/kg/day, 

Weight based output: 4.051 mL/kg/hr





Physical Exam


HEENT: [place] soft flat, eyes clear no discharge, ears normal, nose patent 

with NG tube in place, oropharynx normal.


Chest: Breath sounds equal bilaterally clear no rales, rhonchi, or retractions.


Cardiac: Regular rhythm, no murmurs appreciated with good pulses.


Abdomen: Soft, round, no organomegaly or masses noted with good bowel sounds.


Genitalia: Normal male, patent anus.


Extremity: Full range of motion with good perfusion


CNS: Tone appropriate response to pain and touch


Skin: Pink mild jaundice.


Head Circumference:  28.0





Laboratory


Results 24 hrs





Laboratory Tests








Test


  17


17:41 17


05:30 17


05:31


 


Bedside Glucose 58  L  70  


 


Anion Gap  17  H 


 


Calcium Level  7.1  L 


 


Carbon Dioxide Level  24   


 


Chloride Level  107   


 


Potassium Level  5.3  H 


 


Sodium Level  143   


 


Total Bilirubin  6.6   











Medical Decision Making


Assessment


1. Growth and nutrition: The infant is tolerating feedings of Similac special 

care 45 mL every 3 hours with weight loss of 70 g the last 24 hours. The infant 

attempted to nipple 3 feedings taking between 3 and 10 mL only and required 

partial gavage. OT PT involved. No emesis no conical signs of gastroesophageal 

reflux or NEC. Output is good and temperature stable in a crib.


2. Risk apnea prematurity: The infant remains on room air saturations greater 

than or equal to 99% no recorded apnea, bradycardia, or desaturations.


3. Cardiac: Hemodynamically stable less blood pressure mean 43 no clinical 

signs of the ductus arteriosus.


4. Jaundice: Baby is O+ Usama negative bilirubin today 6.6 we'll recheck in 

a.m.


5. Metabolic: Electrolytes stable calcium however only 7.1 we'll recheck 

calcium and phosphorus in a.m.


6. Infectious disease: No clinical signs or symptoms blood culture negative CBC 

unremarkable.


7. CNS: Tone appropriate needs hearing screen and car seat challenge prior to 

discharge.


8. Social: Parents at bedside and updated on infant's status and progress





Today's Plan


Plan


1. Continue to work with OT/PT and parents on nutritive support


2. Continue to advance feedings for caloric support


3. Monitor for feeding tolerance, gastroesophageal reflux, consistent weight 

gain


4. Monitor for apnea prematurity


5. Follow bilirubin in a.m.


6. Follow-up calcium and phosphorus in a.m.


7. Hearing screen and car seat challenge prior to discharge


8. Same supportive care, training, and teaching.











GEO JOHNSON MD 2017 13:53